# Patient Record
Sex: MALE | Race: WHITE
[De-identification: names, ages, dates, MRNs, and addresses within clinical notes are randomized per-mention and may not be internally consistent; named-entity substitution may affect disease eponyms.]

---

## 2020-03-13 ENCOUNTER — HOSPITAL ENCOUNTER (OUTPATIENT)
Dept: HOSPITAL 8 - CFH | Age: 60
Discharge: HOME | End: 2020-03-13
Attending: INTERNAL MEDICINE
Payer: COMMERCIAL

## 2020-03-13 DIAGNOSIS — I10: Primary | ICD-10-CM

## 2020-03-13 PROCEDURE — 93306 TTE W/DOPPLER COMPLETE: CPT

## 2022-10-25 NOTE — PROGRESS NOTES
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 Wilder Bear because of polycythemia. Lab accompanying the referral from July 2022 shows a H/H of 18.4/55.1. WBC is elevated at 11.4. Platelet count normal. Lab were similar in January 2022 as well. He is diabetic with mild, chronic renal disease. Smokes 1 ppd.     ROS:    Constitutional: No weight loss  Skin: No rash or jaundice  HENT: No change in eyesight or hearing  Cardiovascular:No chest pain or arrythmia  Respiratory:No cough or SOB  GI:No nausea, vomiting, diarrhea, constipation  :No dysuria or frequency  Musculoskeletal:No bone or joint pain  Neuro:No sx's of neuropathy  Psych: No complaints    PMH:      No Known Allergies    History reviewed. No pertinent past medical history.     History reviewed. No pertinent surgical history.     Medications:    Current Outpatient Medications on File Prior to Encounter   Medication Sig Dispense Refill    SitaGLIPtin-MetFORMIN HCl (JANUMET PO) Take  by mouth.      Atorvastatin Calcium (LIPITOR PO) Take  by mouth.      Fenofibrate (TRICOR PO) Take  by mouth.      hydrochlorothiazide (MICROZIDE) 12.5 MG capsule Take 12.5 mg by mouth every day.      azithromycin (ZITHROMAX Z-KALPESH) 250 MG Tab Take as directed 6 Tab 0    fluticasone (FLONASE) 50 MCG/ACT nasal spray Spray 2 Sprays in nose every day. 16 g 0     No current facility-administered medications on file prior to encounter.       Social History     Tobacco Use    Smoking status: Every Day     Packs/day: 1.00     Years: 43.00     Pack years: 43.00     Types: Cigarettes     Start date: 1/1/1979    Smokeless tobacco: Never   Substance Use Topics    Alcohol use: Not on file        History reviewed. No pertinent family history.     Objective    Vitals:    /70 (BP Location: Left arm, Patient Position: Sitting, BP Cuff Size: Adult)   Pulse 98   Temp 36.2 °C (97.2 °F) (Temporal)   Resp 18   Ht  "1.778 m (5' 10\")   Wt 84.1 kg (185 lb 6.5 oz)   SpO2 95%   BMI 26.60 kg/m²     Physical Exam:    Appears well-developed and well-nourished. No distress.    Head -  Normocephalic .   Eyes - Pupils are equal. Conjunctivae normal. No scleral icterus.   Ears - normal hearing  Neck - Neck supple. No thyromegaly  Cardiovascular - Normal rate, regular rhythm, normal heart sounds and intact distal pulses. No  gallop, murmur or rub  Pulmonary - Normal breath sounds.  No wheeze, rales or rhonchi  Abdominal -Soft. No distension, tenderness, organomegaly or mass  Extremities-  No edema or tenderness.    Nodes - No submental, submandibular, preauricular, cervical, axillary or inguinal adenopathy.    Neurological -   Alert and oriented.  Skin - Skin is warm and dry. No rash noted. Not diaphoretic. No erythema. No pallor. No jaundice   Psychiatric -  Normal mood and affect.    Labs:    See PI    Assessment  Probably secondary but wi;; r/o p. vera  Imp:    Visit Diagnosis:    1. Polycythemia  CBC WITH DIFFERENTIAL    JAK2 GENE MUT RFLX CALR RFLX MPL    ERYTHROPOIETIN        Plan:  Above lab and then return to complete consult    Benedicto Benjamin M.D.        "

## 2022-10-31 ENCOUNTER — HOSPITAL ENCOUNTER (OUTPATIENT)
Dept: LAB | Facility: MEDICAL CENTER | Age: 62
End: 2022-10-31
Attending: INTERNAL MEDICINE
Payer: COMMERCIAL

## 2022-10-31 ENCOUNTER — HOSPITAL ENCOUNTER (OUTPATIENT)
Dept: HEMATOLOGY ONCOLOGY | Facility: MEDICAL CENTER | Age: 62
End: 2022-10-31
Attending: INTERNAL MEDICINE
Payer: COMMERCIAL

## 2022-10-31 VITALS
HEIGHT: 70 IN | OXYGEN SATURATION: 95 % | SYSTOLIC BLOOD PRESSURE: 102 MMHG | RESPIRATION RATE: 18 BRPM | TEMPERATURE: 97.2 F | BODY MASS INDEX: 26.54 KG/M2 | HEART RATE: 98 BPM | WEIGHT: 185.41 LBS | DIASTOLIC BLOOD PRESSURE: 70 MMHG

## 2022-10-31 DIAGNOSIS — D75.1 POLYCYTHEMIA: ICD-10-CM

## 2022-10-31 LAB
BASOPHILS # BLD AUTO: 1.6 % (ref 0–1.8)
BASOPHILS # BLD: 0.24 K/UL (ref 0–0.12)
EOSINOPHIL # BLD AUTO: 1.27 K/UL (ref 0–0.51)
EOSINOPHIL NFR BLD: 8.4 % (ref 0–6.9)
ERYTHROCYTE [DISTWIDTH] IN BLOOD BY AUTOMATED COUNT: 46 FL (ref 35.9–50)
HCT VFR BLD AUTO: 53.1 % (ref 42–52)
HGB BLD-MCNC: 18.5 G/DL (ref 14–18)
IMM GRANULOCYTES # BLD AUTO: 0.22 K/UL (ref 0–0.11)
IMM GRANULOCYTES NFR BLD AUTO: 1.5 % (ref 0–0.9)
LYMPHOCYTES # BLD AUTO: 2.69 K/UL (ref 1–4.8)
LYMPHOCYTES NFR BLD: 17.9 % (ref 22–41)
MCH RBC QN AUTO: 32.7 PG (ref 27–33)
MCHC RBC AUTO-ENTMCNC: 34.8 G/DL (ref 33.7–35.3)
MCV RBC AUTO: 93.8 FL (ref 81.4–97.8)
MONOCYTES # BLD AUTO: 1.03 K/UL (ref 0–0.85)
MONOCYTES NFR BLD AUTO: 6.8 % (ref 0–13.4)
NEUTROPHILS # BLD AUTO: 9.59 K/UL (ref 1.82–7.42)
NEUTROPHILS NFR BLD: 63.8 % (ref 44–72)
NRBC # BLD AUTO: 0 K/UL
NRBC BLD-RTO: 0 /100 WBC
PLATELET # BLD AUTO: 279 K/UL (ref 164–446)
PMV BLD AUTO: 10 FL (ref 9–12.9)
RBC # BLD AUTO: 5.66 M/UL (ref 4.7–6.1)
WBC # BLD AUTO: 15 K/UL (ref 4.8–10.8)

## 2022-10-31 PROCEDURE — 81219 CALR GENE COM VARIANTS: CPT

## 2022-10-31 PROCEDURE — 82668 ASSAY OF ERYTHROPOIETIN: CPT

## 2022-10-31 PROCEDURE — 81270 JAK2 GENE: CPT

## 2022-10-31 PROCEDURE — 99203 OFFICE O/P NEW LOW 30 MIN: CPT | Performed by: INTERNAL MEDICINE

## 2022-10-31 PROCEDURE — 36415 COLL VENOUS BLD VENIPUNCTURE: CPT

## 2022-10-31 PROCEDURE — 99202 OFFICE O/P NEW SF 15 MIN: CPT

## 2022-10-31 PROCEDURE — 81338 MPL GENE COMMON VARIANTS: CPT

## 2022-10-31 PROCEDURE — 85025 COMPLETE CBC W/AUTO DIFF WBC: CPT

## 2022-10-31 ASSESSMENT — PAIN SCALES - GENERAL: PAINLEVEL: NO PAIN

## 2022-10-31 ASSESSMENT — PATIENT HEALTH QUESTIONNAIRE - PHQ9: CLINICAL INTERPRETATION OF PHQ2 SCORE: 0

## 2022-11-02 LAB — EPO SERPL-ACNC: 9 MU/ML (ref 4–27)

## 2022-11-03 LAB
JAK2 P.V617F BLD/T QL: NOT DETECTED
SPECIMEN SOURCE: NORMAL

## 2022-11-09 LAB — GENE XXX MUT ANL BLD/T: NOT DETECTED

## 2022-11-10 LAB — MPL P.W515 BLD/T QL: NOT DETECTED

## 2022-11-21 NOTE — PROGRESS NOTES
11/28/22    Subjective    Chief Complaint:  Follow up from consultation for polycythemia    HPI:  62 male seen in consultation 10/31/22 for elevated H/H. VIKTOR 2 mutation analysis is negative and erythropoietin level normal.     ROS:    Constitutional: No weight loss  Skin: No rash or jaundice  HENT: No change in eyesight or hearing  Cardiovascular:No chest pain or arrythmia  Respiratory:No cough or SOB  GI:No nausea, vomiting, diarrhea, constipation  :No dysuria or frequency  Musculoskeletal:No bone or joint pain  Neuro:No sx's of neuropathy  Psych: No complaints    PMH:      No Known Allergies    No past medical history on file.     No past surgical history on file.     Medications:    Current Outpatient Medications on File Prior to Visit   Medication Sig Dispense Refill    SitaGLIPtin-MetFORMIN HCl (JANUMET PO) Take  by mouth.      Atorvastatin Calcium (LIPITOR PO) Take  by mouth.      Fenofibrate (TRICOR PO) Take  by mouth.      hydrochlorothiazide (MICROZIDE) 12.5 MG capsule Take 12.5 mg by mouth every day.      azithromycin (ZITHROMAX Z-KALPESH) 250 MG Tab Take as directed 6 Tab 0    fluticasone (FLONASE) 50 MCG/ACT nasal spray Spray 2 Sprays in nose every day. 16 g 0     No current facility-administered medications on file prior to visit.       Social History     Tobacco Use    Smoking status: Every Day     Packs/day: 1.00     Years: 43.00     Pack years: 43.00     Types: Cigarettes     Start date: 1/1/1979    Smokeless tobacco: Never   Substance Use Topics    Alcohol use: Not on file        No family history on file.     Objective    Vitals:    There were no vitals taken for this visit.    Physical Exam:    Appears well-developed and well-nourished. No distress.    Head -  Normocephalic .   Eyes - Pupils are equal. Conjunctivae normal. No scleral icterus.   Ears - normal hearing  Neurological -   Alert and oriented.  Skin -  No rash noted. Not diaphoretic. No erythema. No pallor. No jaundice   Psychiatric -   Normal mood and affect.    Labs:     Latest Reference Range & Units 10/31/22 15:30   WBC 4.8 - 10.8 K/uL 15.0 (H)   RBC 4.70 - 6.10 M/uL 5.66   Hemoglobin 14.0 - 18.0 g/dL 18.5 (H)   Hematocrit 42.0 - 52.0 % 53.1 (H)   MCV 81.4 - 97.8 fL 93.8   MCH 27.0 - 33.0 pg 32.7   MCHC 33.7 - 35.3 g/dL 34.8   RDW 35.9 - 50.0 fL 46.0   Platelet Count 164 - 446 K/uL 279      10/31/22 15:30   CALR Exon 9 Mutation Analysis - Result Not Detected   MPL codon 515 results Not Detected   JAK2 QUAL, Source Whole Blood   JAK2 QUAL Mutation by PCR Not Detected      Latest Reference Range & Units 10/31/22 15:30   Erythropoietin 4 - 27 mU/mL 9       Assessment  Polycythemia secondary to smoking  Imp:    Visit Diagnosis:    1. Polycythemia          Plan:  Primary can monitor CBC q 3 months - send back to me if Hgb > 19  Discussed smoking cessation and risk of stroke in H/H too high    Benedicto Benjamin M.D.

## 2022-11-28 ENCOUNTER — HOSPITAL ENCOUNTER (OUTPATIENT)
Dept: HEMATOLOGY ONCOLOGY | Facility: MEDICAL CENTER | Age: 62
End: 2022-11-28
Attending: INTERNAL MEDICINE
Payer: COMMERCIAL

## 2022-11-28 VITALS
DIASTOLIC BLOOD PRESSURE: 72 MMHG | HEIGHT: 70 IN | RESPIRATION RATE: 18 BRPM | OXYGEN SATURATION: 96 % | WEIGHT: 183.2 LBS | TEMPERATURE: 96.5 F | SYSTOLIC BLOOD PRESSURE: 120 MMHG | HEART RATE: 90 BPM | BODY MASS INDEX: 26.23 KG/M2

## 2022-11-28 DIAGNOSIS — D75.1 POLYCYTHEMIA: ICD-10-CM

## 2022-11-28 PROCEDURE — 99212 OFFICE O/P EST SF 10 MIN: CPT | Performed by: INTERNAL MEDICINE

## 2022-11-28 PROCEDURE — 99213 OFFICE O/P EST LOW 20 MIN: CPT | Performed by: INTERNAL MEDICINE

## 2022-11-28 ASSESSMENT — PAIN SCALES - GENERAL: PAINLEVEL: NO PAIN
